# Patient Record
Sex: FEMALE | Race: ASIAN | URBAN - METROPOLITAN AREA
[De-identification: names, ages, dates, MRNs, and addresses within clinical notes are randomized per-mention and may not be internally consistent; named-entity substitution may affect disease eponyms.]

---

## 2017-01-12 ENCOUNTER — OFFICE VISIT (OUTPATIENT)
Dept: FAMILY MEDICINE | Facility: CLINIC | Age: 32
End: 2017-01-12

## 2017-01-12 VITALS
HEART RATE: 78 BPM | TEMPERATURE: 98.4 F | BODY MASS INDEX: 32.47 KG/M2 | WEIGHT: 165.4 LBS | HEIGHT: 60 IN | OXYGEN SATURATION: 98 % | RESPIRATION RATE: 20 BRPM | SYSTOLIC BLOOD PRESSURE: 116 MMHG | DIASTOLIC BLOOD PRESSURE: 83 MMHG

## 2017-01-12 DIAGNOSIS — M75.82 ROTATOR CUFF TENDONITIS, LEFT: Primary | ICD-10-CM

## 2017-01-12 NOTE — PATIENT INSTRUCTIONS
- Avoid  press or exercises where you push directly over head for 4 weeks  - After 4 weeks, return to light weight millitary press exercises    Your medication list is printed, please keep this with you, it is helpful to bring this current list to any other medical appointments, the emergency room or hospital.    If you had lab testing today and your results are reassuring or normal they will be be mailed to you within 7 days.     If the lab tests need quick action we will call you with the results.  The phone number we will call with results is # [phone] (home) . If this is not the best number please call our clinic and change the number.    If you need any refills please call your pharmacy and they will contact us.    If you have any further concerns or wish to schedule another appointment you must call our office during normal business hours  [phone] (8-5:00 M-F)  If you have urgent medical questions that cannot wait  you may also call [phone] at any time of day.  If you have a medical emergency please call 911.    Thank you for coming to [facility].  Understanding Rotator Cuff Injuries  The rotator cuff is a team of muscles and connecting tendons in the shoulder. It attaches your upper arm to your shoulder blade. Your rotator cuff helps you reach, throw, push, pull, and lift. Without it, your shoulder can't do its job properly.        Overuse tendonitis is irritation, bruising, or fraying of the rotator cuff.       A healthy rotator cuff  A healthy rotator cuff gives your shoulder flexibility and control. The rotator cuff holds your upper arm bone (humerus) in your shoulder socket (glenoid). It also helps move the shoulder.  A damaged rotator cuff  Pain and weakness told you that something was wrong with your shoulder. Now you know it s a rotator cuff problem. Rotator cuff tendons can become damaged or inflamed. This is called tendonitis. Possible causes include:    Irritation  from overuse    Bursal inflammation (bursitis)    Pinching (impingement)    Calcium deposits (calcification)    Tears in the tendon.  Getting your shoulder healthy again  Care for your shoulder will most likely begin with nonsurgical treatments. You may start with simple rest. If needed, you may have injections that decrease inflammation and pain. Your healthcare provider will tell you how often you may need these treatments. If rest and injections relieve your pain, you will be given an exercise program. This will help restore your shoulder s strength and function. If your pain continues, your healthcare provider may suggest surgery to repair the rotator cuff.    8265-9113 [employer]. [address] [place] [address]. All rights reserved. This information is not intended as a substitute for professional medical care. Always follow your healthcare professional's instructions.        Shoulder and Upper Back Stretch  To start, stand tall with your ears, shoulders, and hips in line. Your feet should be slightly apart, positioned just under your hips. Focus your eyes directly in front of you.  this position for a few seconds before starting your exercise. This helps increase your awareness of proper posture.  Reach overhead and slightly back with both arms. Keep your shoulders and neck aligned and your elbows behind your shoulders:    With your palms facing the ceiling, turn your fingers inward.    Take a deep breath. Breathe out, and lower your elbows toward your buttocks. Hold for 5 seconds, then return to starting position.    Repeat 3 times.         0352-1397 [employer]. [address] [place] [address]. All rights reserved. This information is not intended as a substitute for professional medical care. Always follow your healthcare professional's instructions.        Shoulder Exercises      To start, sit in a chair with your feet flat on the floor. Your weight  should be slightly forward so that you re balanced evenly on your buttocks. Relax your shoulders and keep your head level. Avoid arching your back or rounding your shoulders. Using a chair with arms may help you keep your balance.    Raise your arms, elbows bent, to shoulder height.    Slowly move your forearms together. Hold for 5 seconds.    Return to starting position. Repeat 5 times.    5780-9441 [employer]. [address] [place] [address]. All rights reserved. This information is not intended as a substitute for professional medical care. Always follow your healthcare professional's instructions.        Shoulder Exercises: Internal Rotation    Strengthening exercises help make your injured shoulder more stable. To warm up, do flexibility (stretching) exercises first. Your healthcare provider will tell you what size hand weights to use for the strengthening exercise below. If you don t have hand weights, try using cans of soup instead:    With knees bent, lie on a firm surface. Using the hand on the same side as your injured shoulder, grasp a weight. Bend that arm to a right angle (90 degrees).    Rest your elbow on the floor.    Keeping your elbow next to your side, lower your forearm toward the floor, away from your body. Do not lower your hand all the way to the floor.    Slowly return your forearm to your side. Repeat.    Work up to 5 to 15 lifts.     Note: Support your head and neck with a pillow.     4497-7887 [employer]. [address] [place] [address]. All rights reserved. This information is not intended as a substitute for professional medical care. Always follow your healthcare professional's instructions.

## 2017-01-12 NOTE — MR AVS SNAPSHOT
After Visit Summary   1/12/2017    Abdoulaye Taylor    MRN: [id number]           Patient Information     Date Of Birth          1985        Visit Information        Provider Department      1/12/2017 2:00 PM José Miguel Daniels MD [facility]        Care Instructions    - Avoid  press or exercises where you push directly over head for 4 weeks  - After 4 weeks, return to light weight millitary press exercises    Your medication list is printed, please keep this with you, it is helpful to bring this current list to any other medical appointments, the emergency room or hospital.    If you had lab testing today and your results are reassuring or normal they will be be mailed to you within 7 days.     If the lab tests need quick action we will call you with the results.  The phone number we will call with results is # [phone] (home) . If this is not the best number please call our clinic and change the number.    If you need any refills please call your pharmacy and they will contact us.    If you have any further concerns or wish to schedule another appointment you must call our office during normal business hours  [phone] (8-5:00 M-F)  If you have urgent medical questions that cannot wait  you may also call [phone] at any time of day.  If you have a medical emergency please call 251.    Thank you for coming to [facility].  Understanding Rotator Cuff Injuries  The rotator cuff is a team of muscles and connecting tendons in the shoulder. It attaches your upper arm to your shoulder blade. Your rotator cuff helps you reach, throw, push, pull, and lift. Without it, your shoulder can't do its job properly.        Overuse tendonitis is irritation, bruising, or fraying of the rotator cuff.       A healthy rotator cuff  A healthy rotator cuff gives your shoulder flexibility and control. The rotator cuff holds your upper arm bone (humerus) in your shoulder socket (glenoid). It  also helps move the shoulder.  A damaged rotator cuff  Pain and weakness told you that something was wrong with your shoulder. Now you know it s a rotator cuff problem. Rotator cuff tendons can become damaged or inflamed. This is called tendonitis. Possible causes include:    Irritation from overuse    Bursal inflammation (bursitis)    Pinching (impingement)    Calcium deposits (calcification)    Tears in the tendon.  Getting your shoulder healthy again  Care for your shoulder will most likely begin with nonsurgical treatments. You may start with simple rest. If needed, you may have injections that decrease inflammation and pain. Your healthcare provider will tell you how often you may need these treatments. If rest and injections relieve your pain, you will be given an exercise program. This will help restore your shoulder s strength and function. If your pain continues, your healthcare provider may suggest surgery to repair the rotator cuff.    1044-2821 [employer]. [address] [place] [address]. All rights reserved. This information is not intended as a substitute for professional medical care. Always follow your healthcare professional's instructions.        Shoulder and Upper Back Stretch  To start, stand tall with your ears, shoulders, and hips in line. Your feet should be slightly apart, positioned just under your hips. Focus your eyes directly in front of you.  this position for a few seconds before starting your exercise. This helps increase your awareness of proper posture.  Reach overhead and slightly back with both arms. Keep your shoulders and neck aligned and your elbows behind your shoulders:    With your palms facing the ceiling, turn your fingers inward.    Take a deep breath. Breathe out, and lower your elbows toward your buttocks. Hold for 5 seconds, then return to starting position.    Repeat 3 times.         7292-2468 [employer]. [address] [place] [address]  [place] [address]. All rights reserved. This information is not intended as a substitute for professional medical care. Always follow your healthcare professional's instructions.        Shoulder Exercises      To start, sit in a chair with your feet flat on the floor. Your weight should be slightly forward so that you re balanced evenly on your buttocks. Relax your shoulders and keep your head level. Avoid arching your back or rounding your shoulders. Using a chair with arms may help you keep your balance.    Raise your arms, elbows bent, to shoulder height.    Slowly move your forearms together. Hold for 5 seconds.    Return to starting position. Repeat 5 times.    0853-4053 [employer]. [address] [place] [address]. All rights reserved. This information is not intended as a substitute for professional medical care. Always follow your healthcare professional's instructions.        Shoulder Exercises: Internal Rotation    Strengthening exercises help make your injured shoulder more stable. To warm up, do flexibility (stretching) exercises first. Your healthcare provider will tell you what size hand weights to use for the strengthening exercise below. If you don t have hand weights, try using cans of soup instead:    With knees bent, lie on a firm surface. Using the hand on the same side as your injured shoulder, grasp a weight. Bend that arm to a right angle (90 degrees).    Rest your elbow on the floor.    Keeping your elbow next to your side, lower your forearm toward the floor, away from your body. Do not lower your hand all the way to the floor.    Slowly return your forearm to your side. Repeat.    Work up to 5 to 15 lifts.     Note: Support your head and neck with a pillow.     2692-0721 [employer]. [address] [place] [address]. All rights reserved. This information is not intended as a substitute for professional medical care. Always follow  your healthcare professional's instructions.              Follow-ups after your visit        Who to contact     Please call your clinic at [phone] to:    Ask questions about your health    Make or cancel appointments    Discuss your medicines    Learn about your test results    Speak to your doctor   If you have compliments or concerns about an experience at your clinic, or if you wish to file a complaint, please contact [facility] Patient Relations at [phone] or email us at [email or web address]         Additional Information About Your Visit        [employer] Information     [employer] is an electronic gateway that provides easy, online access to your medical records. With [employer]gence, you can request a clinic appointment, read your test results, renew a prescription or communicate with your care team.     To sign up for [employer] visit the website at [email or web address]   You will be asked to enter the access code listed below, as well as some personal information. Please follow the directions to create your username and password.     Your access code is: [email or web address]  Expires: 3/13/2017 10:33 AM     Your access code will  in 90 days. If you need help or a new code, please contact your [facility] or call [phone] for assistance.        Care EveryWhere ID     This is your Care EveryWhere ID. This could be used by other organizations to access your [facility] medical records  [id number]        Your Vitals Were     Pulse Temperature Respirations Height BMI (Body Mass Index) Pulse Oximetry    78 98.4  F (36.9  C) (Oral) 20 5' (152.4 cm) 32.30 kg/m2 98%    Last Period                   2016            Blood Pressure from Last 3 Encounters:   17 116/83   16 122/82   16 116/81    Weight from Last 3 Encounters:   17 165 lb 6.4 oz (75.025 kg)   16 165 lb (74.844 kg)   16 160 lb 2 oz (72.632 kg)               Today, you had the following     No orders found for display         Today's Medication Changes          These changes are accurate as of: 1/12/17  2:56 PM.  If you have any questions, ask your nurse or doctor.               Stop taking these medicines if you haven't already. Please contact your care team if you have questions.     cyclobenzaprine 5 MG tablet   Commonly known as:  FLEXERIL   Stopped by:  José Miguel Daniels MD                    Primary Care Provider Office Phone # Fax #    Jessica Gladys Tucker [phone]       [facility] [address] [place] [address]        Thank you!     Thank you for choosing [facility]  for your care. Our goal is always to provide you with excellent care. Hearing back from our patients is one way we can continue to improve our services. Please take a few minutes to complete the written survey that you may receive in the mail after your visit with us. Thank you!             Your Updated Medication List - Protect others around you: Learn how to safely use, store and throw away your medicines at www.disposemymeds.org.          This list is accurate as of: 1/12/17  2:56 PM.  Always use your most recent med list.                   Brand Name Dispense Instructions for use    ibuprofen 200 MG tablet    ADVIL/MOTRIN    120 tablet    Take 3 tablets (600 mg) by mouth every 6 hours as needed for mild pain       meclizine 25 MG tablet    ANTIVERT    30 tablet    Take 1 tablet (25 mg) by mouth every 6 hours as needed for dizziness or nausea       nicotine polacrilex 4 MG gum    NICORETTE    160 tablet    Place 1 each (4 mg) inside cheek every hour as needed for smoking cessation       norgestim-eth estrad triphasic 0.18/0.215/0.25 MG-35 MCG per tablet    TRI-SPRINTEC    28 tablet    Take 1 tablet by mouth daily

## 2017-01-14 NOTE — PROGRESS NOTES
SUBJECTIVE:  This is a 31-year-old female with past medical history significant for recurrent headaches.  She presents today with left shoulder pain.  She was seen about a month ago with recurrent headaches, neck and upper back pain.  She tells me today that her headaches are much less frequent and severe.  She is happy with the improvement.  She has pain that is now more isolated to her left shoulder.  She points to her anterior left shoulder at the lateral clavicle and anterior deltoid area as well as pointing to the subscapular and medial scapular border.  This pain is worse with overhead reaching.  She does have some  press type machine activities at her gym.  She also does some chest press.  These activities make her left shoulder pain worse.  She does not have any worsening pain with walking or other cardiovascular exercise.  No shortness of breath.  She finds that stretching sometimes helps.  She has not had any shoulder or neck injury in the past.  No paresthesias, strength changes, bruising or swelling.   REVIEW OF SYSTEMS:  Her weight has been stable.  No vision changes.  No depressive symptoms.   OBJECTIVE:   VITAL SIGNS:  As above.   GENERAL:  She is alert, no distress, relates her own history.   HEENT:  Head is normocephalic and atraumatic.  Eyes, sclerae are nonicteric, noninjected.  Moist mucous membranes.   NECK:  Supple without lymphadenopathy.   CARDIOVASCULAR:  Shows regular rate and rhythm.   EXTREMITIES:  Examination of both shoulders shows normal muscle bulk and tone.  There is no tenderness along the clavicles.  There is no tenderness at the AC joint or the acromion, she does have some tenderness to palpation along the medial left scapular border, range of motion is intact although she has some discomfort with full overhead reaching with her left arm, strength is intact to deltoid, biceps, triceps, wrist flexion, wrist extension.  She has normal muscle bulk and tone.  There is no  erythema and the skin is normal-appearing.  She has some discomfort with internal rotation against resistance.  The remainder of the exam is unremarkable.   ASSESSMENT AND PLAN:   1.  Left shoulder pain:  Likely a rotator cuff tendinopathy.  The rotator cuff is intact.  For now I recommend stopping the weightlifting exercises which are exacerbating symptoms.  I also recommend that after 4 weeks starting a series of exercises to help strengthen and stabilize her shoulders and rotator cuff.  I reviewed some of these today including Ther[employer] activities and I gave her handout which some additional activities.  I anticipate improvement in her pain over the next 4-6 weeks.  She should return if she does not see improvement or if at any point she develops worsening.   2.  Chronic headaches:  Significantly improved over the past 1 month.

## 2020-10-06 ENCOUNTER — RECORDS - HEALTHEAST (OUTPATIENT)
Dept: LAB | Facility: CLINIC | Age: 35
End: 2020-10-06

## 2020-10-06 LAB
CHOLEST SERPL-MCNC: 204 MG/DL
FASTING STATUS PATIENT QL REPORTED: ABNORMAL
HDLC SERPL-MCNC: 51 MG/DL
HIV 1+2 AB+HIV1 P24 AG SERPL QL IA: NEGATIVE
LDLC SERPL CALC-MCNC: 119 MG/DL
LDLC SERPL CALC-MCNC: ABNORMAL MG/DL
TRIGL SERPL-MCNC: 479 MG/DL

## 2020-10-07 LAB
HPV SOURCE: NORMAL
HUMAN PAPILLOMA VIRUS 16 DNA: NEGATIVE
HUMAN PAPILLOMA VIRUS 18 DNA: NEGATIVE
HUMAN PAPILLOMA VIRUS FINAL DIAGNOSIS: NORMAL
HUMAN PAPILLOMA VIRUS OTHER HR: NEGATIVE
SPECIMEN DESCRIPTION: NORMAL
T PALLIDUM AB SER QL: NEGATIVE

## 2020-10-08 LAB — C TRACH DNA SPEC QL PROBE+SIG AMP: NEGATIVE
